# Patient Record
Sex: MALE | URBAN - METROPOLITAN AREA
[De-identification: names, ages, dates, MRNs, and addresses within clinical notes are randomized per-mention and may not be internally consistent; named-entity substitution may affect disease eponyms.]

---

## 2019-11-05 ENCOUNTER — EMERGENCY (EMERGENCY)
Facility: HOSPITAL | Age: 49
LOS: 1 days | Discharge: HOME | End: 2019-11-05
Attending: EMERGENCY MEDICINE
Payer: SELF-PAY

## 2019-11-05 VITALS
WEIGHT: 184.97 LBS | SYSTOLIC BLOOD PRESSURE: 122 MMHG | TEMPERATURE: 98 F | HEIGHT: 68 IN | OXYGEN SATURATION: 98 % | DIASTOLIC BLOOD PRESSURE: 60 MMHG | RESPIRATION RATE: 15 BRPM | HEART RATE: 77 BPM

## 2019-11-05 PROCEDURE — 99284 EMERGENCY DEPT VISIT MOD MDM: CPT | Mod: 25

## 2019-11-05 PROCEDURE — 70450 CT HEAD/BRAIN W/O DYE: CPT

## 2019-11-05 PROCEDURE — 70450 CT HEAD/BRAIN W/O DYE: CPT | Mod: 26

## 2019-11-05 PROCEDURE — 72125 CT NECK SPINE W/O DYE: CPT

## 2019-11-05 PROCEDURE — 99284 EMERGENCY DEPT VISIT MOD MDM: CPT

## 2019-11-05 PROCEDURE — 71250 CT THORAX DX C-: CPT

## 2019-11-05 PROCEDURE — 71250 CT THORAX DX C-: CPT | Mod: 26

## 2019-11-05 PROCEDURE — 72125 CT NECK SPINE W/O DYE: CPT | Mod: 26

## 2019-11-05 RX ADMIN — Medication 100 MILLIGRAM(S): at 21:14

## 2019-11-05 NOTE — ED PROVIDER NOTE - PATIENT PORTAL LINK FT
You can access the FollowMyHealth Patient Portal offered by [facility] by registering at the following website: [email or web address]. By joining [employer]’s FollowMyHealth portal, you will also be able to view your health information using other applications (apps) compatible with our system.

## 2019-11-05 NOTE — ED PROVIDER NOTE - OBJECTIVE STATEMENT
49 yoM, known drinker, admits to drinking. States about a pint normally and today. Denies sz in past. Denies any fall or trauma, but ?complaining Rib pain. Denies head or neck pain. No vomiting. No fever/cough. Wants to sleep.

## 2019-11-05 NOTE — ED PROVIDER NOTE - PROGRESS NOTE DETAILS
Kyle PGY2- signed out to [facility] PGY2, subacute rib fx, pending cth/cspine, Jasen Whitmore DO PGY2 - ambulatory, asymptomatic, tolerating PO. safe for dc

## 2019-11-05 NOTE — ED ADULT NURSE REASSESSMENT NOTE - NS ED NURSE REASSESS COMMENT FT1
Report received from Cedric JONES. Pt calm and cooperative, states he only has pain on RUQ of ABD, with no other complaints.  Denies chest pain, sob, ha, n/v/d, f/c, urinary symptoms, hematuria. Librium given.
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
## 2019-11-05 NOTE — ED PROVIDER NOTE - PHYSICAL EXAMINATION
PHYSICAL EXAM:  GENERAL: malodorous, appears intoxicated, answers some questions   HEAD:  Atraumatic, Normocephalic  EYES: EOMI, PERRLA, conjunctiva and sclera clear  ENMT: No tonsillar erythema, exudates, or enlargement; Moist mucous membranes  NECK: Supple, No JVD no cspine tenderness no signs of trauma   HEART: Regular rate and rhythm; No murmurs, rubs, or gallops  RESPIRATORY: CTA B/L, No W/R/R, R rib ttp, no deformity, no signs of trauma to skin   ABDOMEN: Soft, Nontender, Nondistended  BACK: no signs of trauma, no midline tenderness   NEURO: answers questions, normal speech, impaired recent memory, moving all extremities, PERRL  EXTREMITIES:  2+ Peripheral Pulses, No clubbing, cyanosis, or edema  SKIN: warm, dry, normal color, no rash

## 2019-11-05 NOTE — ED PROVIDER NOTE - NSFOLLOWUPINSTRUCTIONS_ED_ALL_ED_FT
- Lab and imaging results, if performed, were discussed with you along with your discharge diagnosis    - Follow up with your doctor in 1 week - bring copies of your results if you were given. If you do not have a primary doctor, please call [phone] to find one convenient for you    - Return to the ED for any new, worsening, or concerning symptoms to you    - Continue all prescribed medications    - Rest and keep yourself hydrated with fluids

## 2019-11-06 VITALS
OXYGEN SATURATION: 96 % | RESPIRATION RATE: 18 BRPM | SYSTOLIC BLOOD PRESSURE: 156 MMHG | HEART RATE: 86 BPM | DIASTOLIC BLOOD PRESSURE: 84 MMHG | TEMPERATURE: 98 F

## 2019-11-06 NOTE — ED ADULT NURSE REASSESSMENT NOTE - NS ED NURSE REASSESS COMMENT FT1
As per Rachel MELENDEZ, Pt cleared for discharge. Pt A&Ox3, calm and cooperative, discharged with steady gait to waiting room. VSS